# Patient Record
Sex: MALE | Race: WHITE | URBAN - METROPOLITAN AREA
[De-identification: names, ages, dates, MRNs, and addresses within clinical notes are randomized per-mention and may not be internally consistent; named-entity substitution may affect disease eponyms.]

---

## 2018-11-19 ENCOUNTER — OFFICE VISIT (OUTPATIENT)
Dept: URBAN - METROPOLITAN AREA CLINIC 33 | Facility: CLINIC | Age: 78
End: 2018-11-19
Payer: COMMERCIAL

## 2018-11-19 DIAGNOSIS — E11.3491 TYPE 2 DIAB W SEVERE NONPRLF DIAB RTNOP W/O MACULAR EDEMA, RIGHT EYE: Primary | ICD-10-CM

## 2018-11-19 PROCEDURE — 92134 CPTRZ OPH DX IMG PST SGM RTA: CPT | Performed by: OPHTHALMOLOGY

## 2018-11-19 PROCEDURE — 92014 COMPRE OPH EXAM EST PT 1/>: CPT | Performed by: OPHTHALMOLOGY

## 2018-11-19 ASSESSMENT — KERATOMETRY
OS: 41.25
OD: 41.63

## 2018-11-19 ASSESSMENT — INTRAOCULAR PRESSURE
OS: 18
OD: 16

## 2018-11-19 NOTE — IMPRESSION/PLAN
Impression: Type 2 diab w mild nonprlf diabetic rtnop w/o macular edema, left eye: E11.3292. OS. Condition: stable. Vision: vision affected. No hx of LYNDA tx, laser tx OS in the past. Plan: Discussed diagnosis in detail with patient. Exam shows minimal Diabetic changes OS. No treatment is recommended at this time. Emphasized blood sugar control and advised to keep future appointments with PCP and/or Endocrinologist for the management of Diabetes. Recommend observation for now. OCT shows no active edema. Recommend a follow - up in 4 mos, sooner if there is a change or decrease in vision.

## 2018-11-19 NOTE — IMPRESSION/PLAN
Impression: Type 2 diab w severe nonprlf diab rtnop w/o macular edema, right eye: L64.4440. OD. Condition: stable. Vision: vision affected. Hx of LYNDA tx's 5 - 6 tx's - last tx 08/2018. Plan: Discussed diagnosis in detail with patient. Exam shows minimal Diabetic changes OD. No treatment is recommended at this time based on exam and OCT. Emphasized blood sugar control and advised to keep future appointments with PCP and/or Endocrinologist for the management of Diabetes. Recommend observation for now. OCT shows no active edema OD. Recommend a follow - up in 4 mos, sooner if there is a change or decrease in vision.

## 2019-04-09 ENCOUNTER — OFFICE VISIT (OUTPATIENT)
Dept: URBAN - METROPOLITAN AREA CLINIC 23 | Facility: CLINIC | Age: 79
End: 2019-04-09
Payer: COMMERCIAL

## 2019-04-09 PROCEDURE — 92014 COMPRE OPH EXAM EST PT 1/>: CPT | Performed by: OPHTHALMOLOGY

## 2019-04-09 PROCEDURE — 92134 CPTRZ OPH DX IMG PST SGM RTA: CPT | Performed by: OPHTHALMOLOGY

## 2019-04-09 ASSESSMENT — INTRAOCULAR PRESSURE
OS: 16
OD: 16

## 2019-04-09 NOTE — IMPRESSION/PLAN
Impression: Type 2 diab w moderate nonprlf diab rtnop w macular edema, right eye: P85.5957. OD. Condition: unstable. Vision: vision affected. Hx of BRVO OD. Plan: Discussed diagnosis in detail with patient. Exam OD shows active edema associated with BRVO. Recommend LYNDA tx OD - see notes above.

## 2019-04-09 NOTE — IMPRESSION/PLAN
Impression: Tributary (branch) retinal vein occlusion, right eye, with macular edema: H34.8310. OD. Condition: unstable. Vision: vision affected. Hx of LYNDA tx's 5 - 6 tx's - last tx 08/2018. Plan: Discussed diagnosis in detail with patient. Discussed risks of progression with present condition. Based on findings recommend Intravitreal Injection Therapy to help reduce the swelling and prevent a further reduction in vision and/or possible recovery of vision. Discussed the risks and benefits of tx. All questions answered. Patient elects to proceed with recommendation.  OCT shows active edema associated with BRVO OD.

## 2019-04-09 NOTE — IMPRESSION/PLAN
Impression: Type 2 diab w mild nonprlf diabetic rtnop w/o macular edema, left eye: E11.3292. OS. Condition: stable. Plan: Discussed diagnosis in detail with patient. Exam shows minimal Diabetic changes OS. No treatment is recommended at this time. Emphasized blood sugar control and advised to keep future appointments with PCP and/or Endocrinologist for the management of Diabetes. Recommend observation for now. OCT OS is stable.

## 2019-04-26 ENCOUNTER — PROCEDURE (OUTPATIENT)
Dept: URBAN - METROPOLITAN AREA CLINIC 23 | Facility: CLINIC | Age: 79
End: 2019-04-26
Payer: COMMERCIAL

## 2019-04-26 PROCEDURE — 67028 INJECTION EYE DRUG: CPT | Performed by: OPHTHALMOLOGY

## 2019-05-02 ENCOUNTER — OFFICE VISIT (OUTPATIENT)
Dept: URBAN - METROPOLITAN AREA CLINIC 23 | Facility: CLINIC | Age: 79
End: 2019-05-02
Payer: COMMERCIAL

## 2019-05-02 PROCEDURE — 92002 INTRM OPH EXAM NEW PATIENT: CPT | Performed by: OPTOMETRIST

## 2019-05-02 PROCEDURE — 92012 INTRM OPH EXAM EST PATIENT: CPT | Performed by: OPTOMETRIST

## 2019-05-02 PROCEDURE — 92134 CPTRZ OPH DX IMG PST SGM RTA: CPT | Performed by: OPTOMETRIST

## 2019-05-02 ASSESSMENT — INTRAOCULAR PRESSURE
OD: 19
OS: 19

## 2019-05-02 NOTE — IMPRESSION/PLAN
Impression: Tributary (branch) retinal vein occlusion, right eye, with macular edema: H34.8310 OD. Plan: Discussed diagnosis in detail with patient. Reassured patient of current condition and treatment. Will continue to observe condition and or symptoms. BRVO improving.

## 2019-06-07 ENCOUNTER — OFFICE VISIT (OUTPATIENT)
Dept: URBAN - METROPOLITAN AREA CLINIC 23 | Facility: CLINIC | Age: 79
End: 2019-06-07
Payer: COMMERCIAL

## 2019-06-07 PROCEDURE — 92134 CPTRZ OPH DX IMG PST SGM RTA: CPT | Performed by: OPHTHALMOLOGY

## 2019-06-07 PROCEDURE — 99213 OFFICE O/P EST LOW 20 MIN: CPT | Performed by: OPHTHALMOLOGY

## 2019-06-07 ASSESSMENT — INTRAOCULAR PRESSURE
OS: 15
OD: 14

## 2019-06-07 NOTE — IMPRESSION/PLAN
Impression: Type 2 diab w moderate nonprlf diab rtnop w macular edema, right eye: I65.5532. OD. Condition: stable. Vision: vision affected. Hx of BRVO OD. Plan: Discussed diagnosis in detail with patient. Exam OD shows no active edema. Will continue to observe. Pt instructed to call if vision worsens.

## 2019-06-07 NOTE — IMPRESSION/PLAN
Impression: Tributary (branch) retinal vein occlusion, right eye, with macular edema: H34.8310. OD. Condition: stable. Vision: vision affected. s/p AV OD 04/26/2019 Hx of LYNDA tx's 5 - 6 tx's - last tx 08/2018. Plan: Discussed diagnosis in detail with patient. No treatment is required at this time based on exam and OCT. Recommend  observation for now. Will reassess condition in 2 months.  OCT shows stable no active fluid

## 2019-07-01 ENCOUNTER — OFFICE VISIT (OUTPATIENT)
Dept: URBAN - METROPOLITAN AREA CLINIC 23 | Facility: CLINIC | Age: 79
End: 2019-07-01

## 2019-07-01 DIAGNOSIS — Z02.4 ENCOUNTER FOR EXAMINATION FOR DRIVING LICENSE: Primary | ICD-10-CM

## 2019-07-01 DIAGNOSIS — H52.223 REGULAR ASTIGMATISM, BILATERAL: ICD-10-CM

## 2019-07-01 PROCEDURE — 92083 EXTENDED VISUAL FIELD XM: CPT | Performed by: OPTOMETRIST

## 2019-07-01 PROCEDURE — 92012 INTRM OPH EXAM EST PATIENT: CPT | Performed by: OPTOMETRIST

## 2019-07-01 ASSESSMENT — INTRAOCULAR PRESSURE
OD: 14
OS: 14

## 2019-07-01 ASSESSMENT — VISUAL ACUITY
OS: 20/30
OD: 20/50

## 2019-07-01 NOTE — IMPRESSION/PLAN
Impression: Regular astigmatism, bilateral: H52.223. OU. Plan: New glasses Rx was given today. Discussed diagnosis in detail with patient.

## 2019-07-01 NOTE — IMPRESSION/PLAN
Impression: Encounter for examination for driving license: G05.4. OU. Plan: OU: Discussed diagnosis in detail with patient. Reassured patient of current condition and treatment. No treatment required at this time.

## 2019-08-05 ENCOUNTER — OFFICE VISIT (OUTPATIENT)
Dept: URBAN - METROPOLITAN AREA CLINIC 23 | Facility: CLINIC | Age: 79
End: 2019-08-05
Payer: COMMERCIAL

## 2019-08-05 PROCEDURE — 99213 OFFICE O/P EST LOW 20 MIN: CPT | Performed by: OPHTHALMOLOGY

## 2019-08-05 PROCEDURE — 92134 CPTRZ OPH DX IMG PST SGM RTA: CPT | Performed by: OPHTHALMOLOGY

## 2019-08-05 ASSESSMENT — INTRAOCULAR PRESSURE
OS: 14
OD: 14

## 2019-08-05 NOTE — IMPRESSION/PLAN
Impression: Type 2 diab w moderate nonprlf diab rtnop w macular edema, right eye: D65.8484. OD. Condition: stable. Vision: vision affected. Hx of BRVO OD. Plan: Discussed diagnosis in detail with patient. Exam OD shows active edema. See above notes.

## 2019-08-05 NOTE — IMPRESSION/PLAN
Impression: Tributary (branch) retinal vein occlusion, right eye, with macular edema: H34.8310. OD. Condition: unstable. Vision: vision affected. s/p AV OD 04/26/2019 Hx of LYNDA tx's 5 - 6 tx's - last tx 08/2018. Plan: Discussed diagnosis in detail with patient. Discussed risks of progression with present condition. Based on findings recommend Intravitreal Injection Treatment to help reduce the fluid and prevent a further reduction in vision. Discussed the risks and benefits of tx. All questions answered. Patient elects to proceed with recommendation.  OCT shows increase in leakage

## 2019-08-16 ENCOUNTER — PROCEDURE (OUTPATIENT)
Dept: URBAN - METROPOLITAN AREA CLINIC 23 | Facility: CLINIC | Age: 79
End: 2019-08-16
Payer: COMMERCIAL

## 2019-08-16 PROCEDURE — 67028 INJECTION EYE DRUG: CPT | Performed by: OPHTHALMOLOGY

## 2019-09-27 ENCOUNTER — OFFICE VISIT (OUTPATIENT)
Dept: URBAN - METROPOLITAN AREA CLINIC 23 | Facility: CLINIC | Age: 79
End: 2019-09-27
Payer: COMMERCIAL

## 2019-09-27 PROCEDURE — 92134 CPTRZ OPH DX IMG PST SGM RTA: CPT | Performed by: OPHTHALMOLOGY

## 2019-09-27 PROCEDURE — 99213 OFFICE O/P EST LOW 20 MIN: CPT | Performed by: OPHTHALMOLOGY

## 2019-09-27 ASSESSMENT — INTRAOCULAR PRESSURE
OS: 12
OD: 13

## 2019-09-27 NOTE — IMPRESSION/PLAN
Impression: Tributary (branch) retinal vein occlusion, right eye, with macular edema: H34.8310. OD. Condition: stable. Vision: vision affected. s/p AV OD 08/16/19 Hx of LYNDA tx's 5 - 6 tx's - last tx 08/2018. Plan: Discussed diagnosis in detail with patient. Exam and OCT shows marked decrease in leakage. No treatment is required at this time based on exam and OCT. Recommend observation for now. Will reassess condition in 6 wks.

## 2019-09-27 NOTE — IMPRESSION/PLAN
Impression: Type 2 diab w moderate nonprlf diab rtnop w macular edema, right eye: J94.0893. OD. Condition: stable. Vision: vision affected. Hx of BRVO OD. Plan: Discussed diagnosis in detail with patient. Exam OD shows Marked decrease in leakage. See notes above.

## 2019-11-01 ENCOUNTER — PATIENT COMMUNICATION (OUTPATIENT)
Dept: URBAN - METROPOLITAN AREA CLINIC 23 | Facility: CLINIC | Age: 79
End: 2019-11-01
Payer: COMMERCIAL

## 2019-11-01 DIAGNOSIS — H34.8310 TRIBUTARY (BRANCH) RETINAL VEIN OCCLUSION, RIGHT EYE, WITH MACULAR EDEMA: Primary | ICD-10-CM

## 2019-11-01 PROCEDURE — 92014 COMPRE OPH EXAM EST PT 1/>: CPT | Performed by: OPHTHALMOLOGY

## 2019-11-01 PROCEDURE — 92134 CPTRZ OPH DX IMG PST SGM RTA: CPT | Performed by: OPHTHALMOLOGY

## 2019-11-01 ASSESSMENT — INTRAOCULAR PRESSURE
OD: 16
OS: 16

## 2019-11-01 NOTE — IMPRESSION/PLAN
Impression: Tributary (branch) retinal vein occlusion, right eye, with macular edema: H34.8310. OD. Condition: unstable. Vision: vision affected. s/p AV OD 08/16/19 Hx of LYNDA tx's 5 - 6 tx's - last tx 08/2018. Plan: Discussed diagnosis in detail with patient. Discussed risks of progression. Recommend Macular Photocoagulation Laser treatment MAP RIGHT EYE to help reduce the swelling and prevent a further reduction in vision. Discussed risks/benefits of laser TX. All questions answered. RL1 OCT performed today: minimal fluid OD. Patient understands that additional LYNDA treatments or laser treatments may be needed in the future.

## 2019-11-01 NOTE — IMPRESSION/PLAN
Impression: Type 2 diab w moderate nonprlf diab rtnop w macular edema, right eye: P98.9416. OD. Condition: unstable. Vision: vision affected. Hx of BRVO OD. Plan: Discussed diagnosis in detail with patient. Exam OD shows mild edema. recommend laser tx - see notes above.

## 2019-11-12 ENCOUNTER — SURGERY (OUTPATIENT)
Dept: URBAN - METROPOLITAN AREA SURGERY 11 | Facility: SURGERY | Age: 79
End: 2019-11-12
Payer: COMMERCIAL

## 2019-11-12 PROCEDURE — 67210 TREATMENT OF RETINAL LESION: CPT | Performed by: OPHTHALMOLOGY

## 2019-12-12 ENCOUNTER — POST-OPERATIVE VISIT (OUTPATIENT)
Dept: URBAN - METROPOLITAN AREA CLINIC 23 | Facility: CLINIC | Age: 79
End: 2019-12-12

## 2019-12-12 DIAGNOSIS — Z09 ENCNTR FOR F/U EXAM AFT TRTMT FOR COND OTH THAN MALIG NEOPLM: Primary | ICD-10-CM

## 2019-12-12 PROCEDURE — 99024 POSTOP FOLLOW-UP VISIT: CPT | Performed by: OPTOMETRIST

## 2019-12-12 ASSESSMENT — INTRAOCULAR PRESSURE
OD: 16
OS: 15

## 2020-02-11 ENCOUNTER — OFFICE VISIT (OUTPATIENT)
Dept: URBAN - METROPOLITAN AREA CLINIC 23 | Facility: CLINIC | Age: 80
End: 2020-02-11
Payer: COMMERCIAL

## 2020-02-11 DIAGNOSIS — E11.3311 TYPE 2 DIAB W MODERATE NONPRLF DIAB RTNOP W MACULAR EDEMA, RIGHT EYE: ICD-10-CM

## 2020-02-11 PROCEDURE — 99213 OFFICE O/P EST LOW 20 MIN: CPT | Performed by: OPHTHALMOLOGY

## 2020-02-11 PROCEDURE — 92134 CPTRZ OPH DX IMG PST SGM RTA: CPT | Performed by: OPHTHALMOLOGY

## 2020-02-11 ASSESSMENT — INTRAOCULAR PRESSURE
OS: 16
OD: 16

## 2020-02-11 NOTE — IMPRESSION/PLAN
Impression: Type 2 diab w moderate nonprlf diab rtnop w macular edema, right eye: H81.8182. OD. Condition: improving. Vision: vision affected. Hx of BRVO OD. Plan: Discussed diagnosis in detail with patient. Exam OD shows mild edema. see notes above.

## 2020-02-11 NOTE — IMPRESSION/PLAN
Impression: Tributary (branch) retinal vein occlusion, right eye, with macular edema: H34.8310. OD. Condition: improving. Vision: vision affected. s/p MAP OD 11/12/2019,  AV OD 08/16/19 Hx of LYNDA tx's 5 - 6 tx's - last tx 08/2018. Plan: Discussed diagnosis in detail with patient. Exam and OCT OD shows mild edema s/p focal laser treatment. Advised patient no treatment is required at this time based on exam and OCT. Recommend observation for now. Will reassess condition in 6 wks.

## 2020-05-05 ENCOUNTER — OFFICE VISIT (OUTPATIENT)
Dept: URBAN - METROPOLITAN AREA CLINIC 23 | Facility: CLINIC | Age: 80
End: 2020-05-05
Payer: COMMERCIAL

## 2020-05-05 DIAGNOSIS — E11.3292 TYPE 2 DIAB W MILD NONPRLF DIABETIC RTNOP W/O MACULAR EDEMA, LEFT EYE: ICD-10-CM

## 2020-05-05 PROCEDURE — 99213 OFFICE O/P EST LOW 20 MIN: CPT | Performed by: OPHTHALMOLOGY

## 2020-05-05 ASSESSMENT — INTRAOCULAR PRESSURE
OS: 14
OD: 14

## 2020-05-05 NOTE — IMPRESSION/PLAN
Impression: Type 2 diab w mild nonprlf diabetic rtnop w/o macular edema, left eye: E11.3292. OS. Condition: stable. Plan: Due to Coronavirus COVID-19 pandemic and National Emergency, deferred Slit Lamp examination. Findings are based on OCT and Optos. OCT shows stable no active edema  and Optos shows stable macula. Recommend a retina follow - up in 4-6 wks.

## 2020-05-05 NOTE — IMPRESSION/PLAN
Impression: Tributary (branch) retinal vein occlusion, right eye, with macular edema: H34.8310. OD. Condition: unstable. Vision: vision affected. s/p MAP OD 11/12/2019,  AV OD 08/16/19 Hx of LYNDA tx's 5 - 6 tx's - last tx 08/2018. Plan: Due to Coronavirus COVID-19 pandemic and National Emergency, deferred Slit Lamp examination. Findings are based on OCT and Optos. OCT  shows increased edema OD and Optos shows increased edema and heme OD Based on findings recommend to continue with Intravitreal Injection Treatment to help reduce the swelling and prevent a further reduction in vision. Discussed the risks and benefits of tx. All questions answered. Patient elects to proceed with recommendation.

## 2020-05-05 NOTE — IMPRESSION/PLAN
Impression: Type 2 diab w moderate nonprlf diab rtnop w macular edema, right eye: D52.7985. OD. Condition: unstable. Vision: vision affected. Hx of BRVO OD. Plan: Advised patient of condition. Discussed diagnosis in detail with patient.  see notes above

## 2020-05-08 ENCOUNTER — PROCEDURE (OUTPATIENT)
Dept: URBAN - METROPOLITAN AREA CLINIC 23 | Facility: CLINIC | Age: 80
End: 2020-05-08
Payer: COMMERCIAL

## 2020-05-08 PROCEDURE — 67028 INJECTION EYE DRUG: CPT | Performed by: OPHTHALMOLOGY

## 2020-09-18 ENCOUNTER — OFFICE VISIT (OUTPATIENT)
Dept: URBAN - METROPOLITAN AREA CLINIC 23 | Facility: CLINIC | Age: 80
End: 2020-09-18
Payer: COMMERCIAL

## 2020-09-18 PROCEDURE — 92134 CPTRZ OPH DX IMG PST SGM RTA: CPT | Performed by: OPHTHALMOLOGY

## 2020-09-18 PROCEDURE — 99213 OFFICE O/P EST LOW 20 MIN: CPT | Performed by: OPHTHALMOLOGY

## 2020-09-18 ASSESSMENT — INTRAOCULAR PRESSURE
OD: 15
OS: 14

## 2020-09-18 NOTE — IMPRESSION/PLAN
Impression: Tributary (branch) retinal vein occlusion, right eye, with macular edema: H34.8310. OD. Condition: stable. Vision: vision affected. s/p AV OD 05/08/2020, AV OD 08/16/2019
s/p MAP OD 11/12/2019 Hx of LYNDA tx's 5 - 6 tx's - last tx 08/2018. Plan: Due to Coronavirus COVID-19 pandemic and National Emergency, deferred Slit Lamp examination. Findings are based on OCT and Optos. OCT shows a decrease in edema OD and Optos shows a decrease in heme inf temp OD. Recommend a retina follow - up in 3 mos, sooner if there is a change or decrease in vision.

## 2020-09-18 NOTE — IMPRESSION/PLAN
Impression: Type 2 diab w mild nonprlf diabetic rtnop w/o macular edema, left eye: E11.3292. OS. Condition: stable. Plan: Due to Coronavirus COVID-19 pandemic and National Emergency, deferred Slit Lamp examination. Findings are based on OCT and Optos. OCT and Optos shows the macula / retina is stable OS. Recommend a retina follow - up in 3 mos. Emphasized blood sugar control and advised to keep future appointments with PCP and/or Endocrinologist for the management of Diabetes.

## 2020-09-18 NOTE — IMPRESSION/PLAN
Impression: Type 2 diab w moderate nonprlf diab rtnop w macular edema, right eye: V69.3055. OD. Condition: stable. Vision: vision affected. Hx of BRVO OD. Plan: Due to Coronavirus COVID-19 pandemic and National Emergency, deferred Slit Lamp examination. Findings are based on OCT and Optos (see notes above). Based on diagnostic findings recommend observation. Will reassess condition in 3 mos, sooner if there is a change or decrease in vision. Emphasized blood sugar control and advised to keep future appointments with PCP and/or Endocrinologist for the management of Diabetes.

## 2021-01-08 ENCOUNTER — OFFICE VISIT (OUTPATIENT)
Dept: URBAN - METROPOLITAN AREA CLINIC 23 | Facility: CLINIC | Age: 81
End: 2021-01-08
Payer: COMMERCIAL

## 2021-01-08 PROCEDURE — 99213 OFFICE O/P EST LOW 20 MIN: CPT | Performed by: OPHTHALMOLOGY

## 2021-01-08 PROCEDURE — 92134 CPTRZ OPH DX IMG PST SGM RTA: CPT | Performed by: OPHTHALMOLOGY

## 2021-01-08 ASSESSMENT — INTRAOCULAR PRESSURE
OS: 10
OD: 10

## 2021-01-08 NOTE — IMPRESSION/PLAN
Impression: Type 2 diab w moderate nonprlf diab rtnop w macular edema, right eye: T03.1358. OD. Condition: stable. Vision: vision affected. Hx of BRVO OD. Plan: Due to Coronavirus COVID-19 pandemic and National Emergency, deferred Slit Lamp examination. Findings are based on OCT decreased edema and Optos mild dot blot heme. Based on diagnostic findings recommend observation. Will reassess condition in 3 mos, sooner if there is a change or decrease in vision. Emphasized blood sugar control and advised to keep future appointments with PCP and/or Endocrinologist for the management of Diabetes.

## 2021-01-08 NOTE — IMPRESSION/PLAN
Impression: Tributary (branch) retinal vein occlusion, right eye, with macular edema: H34.8310. OD. Condition: stable. Vision: vision affected. s/p AV OD 05/08/2020, AV OD 08/16/2019
s/p MAP OD 11/12/2019 Hx of LYNDA tx's 5 - 6 tx's - last tx 08/2018. Plan: Due to Coronavirus COVID-19 pandemic and National Emergency, deferred Slit Lamp examination. Findings are based on OCT and Optos. OCT shows a decrease in edema OD and Optos shows mild dot blot heme OD. Recommend a retina follow - up in 3 mos, sooner if there is a change or decrease in vision.

## 2021-04-12 ENCOUNTER — OFFICE VISIT (OUTPATIENT)
Dept: URBAN - METROPOLITAN AREA CLINIC 23 | Facility: CLINIC | Age: 81
End: 2021-04-12
Payer: COMMERCIAL

## 2021-04-12 PROCEDURE — 92134 CPTRZ OPH DX IMG PST SGM RTA: CPT | Performed by: OPHTHALMOLOGY

## 2021-04-12 PROCEDURE — 99213 OFFICE O/P EST LOW 20 MIN: CPT | Performed by: OPHTHALMOLOGY

## 2021-04-12 ASSESSMENT — INTRAOCULAR PRESSURE
OS: 15
OD: 15

## 2021-04-12 NOTE — IMPRESSION/PLAN
Impression: Type 2 diab w moderate nonprlf diab rtnop w macular edema, right eye: M71.9052. OD. Condition: stable. Vision: vision affected. Hx of BRVO OD. Plan: Due to Coronavirus COVID-19 pandemic and National Emergency, deferred Slit Lamp examination. Findings are based on OCT minimal edema and Optos minimal edema w/minimal heme OD. Based on diagnostic findings recommend johanny [place]      - see notes above. Emphasized blood sugar control and advised to keep future appointments with PCP and/or Endocrinologist for the management of Diabetes.

## 2021-04-12 NOTE — IMPRESSION/PLAN
Impression: Tributary (branch) retinal vein occlusion, right eye, with macular edema: H34.8310. OD. Condition: unstable. Vision: vision affected. s/p AV OD 05/08/2020, AV OD 08/16/2019
s/p MAP OD 11/12/2019 Hx of LYNDA tx's 5 - 6 tx's - last tx 08/2018. Plan: Due to Coronavirus COVID-19 pandemic and National Emergency, deferred Slit Lamp examination. Findings are based on OCT and Optos. OCT and Optos shows minimal edema with minimal heme OD. Based on findings recommend to restart Intravitreal Injection Treatment RIGHTEYE with AVASTIN to help reduce the swelling / heme and prevent a further reduction in vision. Discussed the risks and benefits of tx. All questions answered. Patient elects to proceed with recommendation.

## 2021-04-23 ENCOUNTER — PROCEDURE (OUTPATIENT)
Dept: URBAN - METROPOLITAN AREA CLINIC 23 | Facility: CLINIC | Age: 81
End: 2021-04-23
Payer: COMMERCIAL

## 2021-04-23 PROCEDURE — 67028 INJECTION EYE DRUG: CPT | Performed by: OPHTHALMOLOGY

## 2021-06-04 ENCOUNTER — OFFICE VISIT (OUTPATIENT)
Dept: URBAN - METROPOLITAN AREA CLINIC 23 | Facility: CLINIC | Age: 81
End: 2021-06-04
Payer: COMMERCIAL

## 2021-06-04 PROCEDURE — 92134 CPTRZ OPH DX IMG PST SGM RTA: CPT | Performed by: OPHTHALMOLOGY

## 2021-06-04 PROCEDURE — 99213 OFFICE O/P EST LOW 20 MIN: CPT | Performed by: OPHTHALMOLOGY

## 2021-06-04 ASSESSMENT — INTRAOCULAR PRESSURE
OD: 14
OS: 14

## 2021-06-04 NOTE — IMPRESSION/PLAN
Impression: Tributary (branch) retinal vein occlusion, right eye, with macular edema: H34.8310. OD. Condition: stable. Vision: vision affected. s/p AV OD 4/23/2021, AV OD 05/08/2020, AV OD 08/16/2019
s/p MAP OD 11/12/2019 Hx of LYNDA tx's 5 - 6 tx's - last tx 08/2018. Plan: Due to Coronavirus COVID-19 pandemic and National Emergency, deferred Slit Lamp examination. Findings are based on OCT and Optos. OCT OD shows decreased edema and Optos shows minimal leakage, improved. No treatment is require at this time. Recommend a retina follow - up in 2 months.

## 2021-06-04 NOTE — IMPRESSION/PLAN
Impression: Type 2 diab w moderate nonprlf diab rtnop w macular edema, right eye: A66.3336. OD. Condition: stable. Vision: vision affected. Hx of BRVO OD. Plan: Due to Coronavirus COVID-19 pandemic and National Emergency, deferred Slit Lamp examination. Findings are based on OCT and Optos. OCT OD shows decreased edema and Optos shows minimal leakage, improved Based on diagnostic findings recommend observation for now. Will reassess condition in 8 wks. Emphasized blood sugar control and advised to keep future appointments with PCP and/or Endocrinologist for the management of Diabetes.

## 2021-06-04 NOTE — IMPRESSION/PLAN
Impression: Type 2 diab w mild nonprlf diabetic rtnop w/o macular edema, left eye: E11.3292. OS. Condition: stable. Plan: Due to Coronavirus COVID-19 pandemic and National Emergency, deferred Slit Lamp examination. Findings are based on OCT and Optos. OCT and Optos shows the macula / retina is stable OS. Recommend a retina follow - up in 2 mos. Emphasized blood sugar control and advised to keep future appointments with PCP and/or Endocrinologist for the management of Diabetes.

## 2021-07-12 ENCOUNTER — OFFICE VISIT (OUTPATIENT)
Dept: URBAN - METROPOLITAN AREA CLINIC 23 | Facility: CLINIC | Age: 81
End: 2021-07-12
Payer: COMMERCIAL

## 2021-07-12 PROCEDURE — 92014 COMPRE OPH EXAM EST PT 1/>: CPT | Performed by: OPTOMETRIST

## 2021-07-12 ASSESSMENT — INTRAOCULAR PRESSURE
OS: 15
OD: 15

## 2021-07-12 ASSESSMENT — VISUAL ACUITY
OS: 20/30
OD: 20/40

## 2022-02-16 ENCOUNTER — OFFICE VISIT (OUTPATIENT)
Dept: URBAN - METROPOLITAN AREA CLINIC 23 | Facility: CLINIC | Age: 82
End: 2022-02-16
Payer: COMMERCIAL

## 2022-02-16 DIAGNOSIS — H52.4 PRESBYOPIA: Primary | ICD-10-CM

## 2022-02-16 PROCEDURE — 92014 COMPRE OPH EXAM EST PT 1/>: CPT | Performed by: OPTOMETRIST

## 2022-02-16 ASSESSMENT — INTRAOCULAR PRESSURE
OS: 15
OD: 17

## 2022-02-16 ASSESSMENT — VISUAL ACUITY
OD: 20/40
OS: 20/30

## 2022-11-18 ENCOUNTER — OFFICE VISIT (OUTPATIENT)
Dept: URBAN - METROPOLITAN AREA CLINIC 23 | Facility: CLINIC | Age: 82
End: 2022-11-18
Payer: COMMERCIAL

## 2022-11-18 DIAGNOSIS — E11.3393 TYPE 2 DIAB W MODERATE NONPRLF DIAB RTNOP W/O MACULAR EDEMA, BILATERAL: Primary | ICD-10-CM

## 2022-11-18 DIAGNOSIS — H25.12 AGE-RELATED NUCLEAR CATARACT, LEFT EYE: ICD-10-CM

## 2022-11-18 PROCEDURE — 99214 OFFICE O/P EST MOD 30 MIN: CPT | Performed by: OPTOMETRIST

## 2022-11-18 ASSESSMENT — INTRAOCULAR PRESSURE
OS: 14
OD: 14

## 2022-11-18 NOTE — IMPRESSION/PLAN
Impression: Type 2 diab w moderate nonprlf diab rtnop w/o macular edema, bilateral: L33.0357. Plan: Discussed diagnosis in detail with patient. Advised and emphasized patient of blood sugar control. Discussed risks of progression. Poor compliance can lead to blindness. Optos performed and reviewed with patient today. Reassured patient of condition and treatment. Will continue to observe condition and or symptoms.

## 2022-11-18 NOTE — IMPRESSION/PLAN
Impression: Age-related nuclear cataract, left eye: H25.12. Plan: Cataracts account for the patient's complaints. Discussed diagnosis in detail with patient. Discussed all R/B's and procedures. Patient understands changing glasses prescription will not significantly improve vision. Patient elects to have surgery, phacoemulsification with intraocular lens recommended. Discussed lens options of Toric/Multifocal, or Standard lens, RL2. Will refer to cataract surgeon for pre-operative evaluation.

## 2022-12-13 ENCOUNTER — OFFICE VISIT (OUTPATIENT)
Dept: URBAN - METROPOLITAN AREA CLINIC 23 | Facility: CLINIC | Age: 82
End: 2022-12-13
Payer: COMMERCIAL

## 2022-12-13 DIAGNOSIS — H25.12 AGE-RELATED NUCLEAR CATARACT, LEFT EYE: Primary | ICD-10-CM

## 2022-12-13 DIAGNOSIS — Z96.1 PRESENCE OF PSEUDOPHAKIA: ICD-10-CM

## 2022-12-13 PROCEDURE — 99214 OFFICE O/P EST MOD 30 MIN: CPT | Performed by: OPHTHALMOLOGY

## 2022-12-13 RX ORDER — PREDNISOLONE ACETATE 10 MG/ML
1 % SUSPENSION/ DROPS OPHTHALMIC
Qty: 10 | Refills: 1 | Status: ACTIVE
Start: 2022-12-13

## 2022-12-13 RX ORDER — OFLOXACIN 3 MG/ML
0.3 % SOLUTION/ DROPS OPHTHALMIC
Qty: 5 | Refills: 1 | Status: ACTIVE
Start: 2022-12-13

## 2022-12-13 ASSESSMENT — INTRAOCULAR PRESSURE
OD: 15
OS: 15

## 2022-12-13 ASSESSMENT — VISUAL ACUITY
OS: 20/70
OD: 20/60

## 2022-12-13 NOTE — IMPRESSION/PLAN
Impression: Age-related nuclear cataract, left eye: H25.12. Condition: established, worsening. Symptoms: could improve with surgery. Plan: Cataract accounts for patient's complaints. Reviewed risks, benefits, and procedure. Patient desires surgery, schedule ce/iol OS, RL2, discussed lens options, distance refractive target, patient is clear for surgery in [facility].

## 2022-12-13 NOTE — IMPRESSION/PLAN
Impression: Presence of pseudophakia: Z96.1. Plan: Discussed diagnosis w/ pt. Will continue to monitor with yearly exams.

## 2024-04-30 ENCOUNTER — OFFICE VISIT (OUTPATIENT)
Dept: URBAN - METROPOLITAN AREA CLINIC 23 | Facility: CLINIC | Age: 84
End: 2024-04-30
Payer: COMMERCIAL

## 2024-04-30 DIAGNOSIS — E11.3411 TYPE 2 DIAB W SEVERE NONPRLF DIABETIC RTNOP W MACULAR EDEMA, RIGHT EYE: Primary | ICD-10-CM

## 2024-04-30 DIAGNOSIS — H25.12 AGE-RELATED NUCLEAR CATARACT, LEFT EYE: ICD-10-CM

## 2024-04-30 DIAGNOSIS — E11.3392 TYPE 2 DIAB W MODERATE NONPRLF DIAB RTNOP W/O MACULAR EDEMA, LEFT EYE: ICD-10-CM

## 2024-04-30 PROCEDURE — 99214 OFFICE O/P EST MOD 30 MIN: CPT | Performed by: OPTOMETRIST

## 2024-04-30 PROCEDURE — 92134 CPTRZ OPH DX IMG PST SGM RTA: CPT | Performed by: OPTOMETRIST

## 2024-04-30 ASSESSMENT — INTRAOCULAR PRESSURE
OS: 17
OD: 18

## 2024-04-30 ASSESSMENT — KERATOMETRY
OD: 41.88
OS: 41.63

## 2024-07-23 ENCOUNTER — OFFICE VISIT (OUTPATIENT)
Dept: URBAN - METROPOLITAN AREA CLINIC 23 | Facility: CLINIC | Age: 84
End: 2024-07-23
Payer: COMMERCIAL

## 2024-07-23 DIAGNOSIS — H25.12 AGE-RELATED NUCLEAR CATARACT, LEFT EYE: ICD-10-CM

## 2024-07-23 PROCEDURE — 99213 OFFICE O/P EST LOW 20 MIN: CPT | Performed by: OPHTHALMOLOGY

## 2024-07-23 PROCEDURE — 92134 CPTRZ OPH DX IMG PST SGM RTA: CPT | Performed by: OPHTHALMOLOGY

## 2024-07-23 ASSESSMENT — INTRAOCULAR PRESSURE
OS: 16
OD: 15

## 2024-07-24 ENCOUNTER — OFFICE VISIT (OUTPATIENT)
Dept: URBAN - METROPOLITAN AREA CLINIC 23 | Facility: CLINIC | Age: 84
End: 2024-07-24
Payer: COMMERCIAL

## 2024-07-24 DIAGNOSIS — E11.3292 TYPE 2 DIAB W MILD NONPRLF DIABETIC RTNOP W/O MACULAR EDEMA, LEFT EYE: ICD-10-CM

## 2024-07-24 DIAGNOSIS — E11.3311 TYPE 2 DIAB WITH MOD NONP RTNOP WITH MACULAR EDEMA, R EYE: ICD-10-CM

## 2024-07-24 PROCEDURE — 99214 OFFICE O/P EST MOD 30 MIN: CPT | Performed by: OPHTHALMOLOGY

## 2024-07-24 RX ORDER — OFLOXACIN 3 MG/ML
0.3 % SOLUTION/ DROPS OPHTHALMIC
Qty: 5 | Refills: 1 | Status: ACTIVE
Start: 2024-07-24

## 2024-07-24 RX ORDER — PREDNISOLONE ACETATE 10 MG/ML
1 % SUSPENSION/ DROPS OPHTHALMIC
Qty: 10 | Refills: 1 | Status: ACTIVE
Start: 2024-07-24

## 2024-07-24 ASSESSMENT — KERATOMETRY
OS: 41.50
OD: 41.88

## 2024-07-24 ASSESSMENT — VISUAL ACUITY
OD: 20/150
OS: 20/70

## 2024-07-24 ASSESSMENT — INTRAOCULAR PRESSURE
OD: 14
OS: 14

## 2024-07-26 ENCOUNTER — OFFICE VISIT (OUTPATIENT)
Dept: URBAN - METROPOLITAN AREA CLINIC 23 | Facility: CLINIC | Age: 84
End: 2024-07-26
Payer: COMMERCIAL

## 2024-07-26 PROCEDURE — 67028 INJECTION EYE DRUG: CPT | Performed by: OPHTHALMOLOGY

## 2024-07-30 ENCOUNTER — TECH ONLY (OUTPATIENT)
Dept: URBAN - METROPOLITAN AREA CLINIC 23 | Facility: CLINIC | Age: 84
End: 2024-07-30
Payer: COMMERCIAL

## 2024-07-30 DIAGNOSIS — H25.12 AGE-RELATED NUCLEAR CATARACT, LEFT EYE: Primary | ICD-10-CM

## 2024-07-30 ASSESSMENT — PACHYMETRY
OD: 23.67
OS: 2.57
OS: 23.34
OD: 3.97

## 2024-08-07 ENCOUNTER — SURGERY (OUTPATIENT)
Dept: URBAN - METROPOLITAN AREA SURGERY 11 | Facility: SURGERY | Age: 84
End: 2024-08-07
Payer: COMMERCIAL

## 2024-08-07 PROCEDURE — 66984 XCAPSL CTRC RMVL W/O ECP: CPT | Performed by: OPHTHALMOLOGY

## 2024-08-08 ENCOUNTER — POST-OPERATIVE VISIT (OUTPATIENT)
Dept: URBAN - METROPOLITAN AREA CLINIC 23 | Facility: CLINIC | Age: 84
End: 2024-08-08
Payer: COMMERCIAL

## 2024-08-08 DIAGNOSIS — Z48.810 ENCOUNTER FOR SURGICAL AFTERCARE FOLLOWING SURGERY ON A SENSE ORGAN: Primary | ICD-10-CM

## 2024-08-08 PROCEDURE — 99024 POSTOP FOLLOW-UP VISIT: CPT | Performed by: OPTOMETRIST

## 2024-08-08 ASSESSMENT — INTRAOCULAR PRESSURE
OS: 16
OD: 14

## 2024-08-15 ENCOUNTER — POST-OPERATIVE VISIT (OUTPATIENT)
Dept: URBAN - METROPOLITAN AREA CLINIC 23 | Facility: CLINIC | Age: 84
End: 2024-08-15
Payer: COMMERCIAL

## 2024-08-15 DIAGNOSIS — Z96.1 PRESENCE OF INTRAOCULAR LENS: ICD-10-CM

## 2024-08-15 DIAGNOSIS — H52.4 PRESBYOPIA: Primary | ICD-10-CM

## 2024-08-15 PROCEDURE — 99024 POSTOP FOLLOW-UP VISIT: CPT | Performed by: OPTOMETRIST

## 2024-08-15 ASSESSMENT — VISUAL ACUITY
OD: 20/200
OS: 20/30

## 2024-08-15 ASSESSMENT — INTRAOCULAR PRESSURE
OD: 15
OS: 15

## 2024-09-12 ENCOUNTER — POST-OPERATIVE VISIT (OUTPATIENT)
Dept: URBAN - METROPOLITAN AREA CLINIC 23 | Facility: CLINIC | Age: 84
End: 2024-09-12
Payer: COMMERCIAL

## 2024-09-12 DIAGNOSIS — Z96.1 PRESENCE OF INTRAOCULAR LENS: ICD-10-CM

## 2024-09-12 DIAGNOSIS — H52.4 PRESBYOPIA: Primary | ICD-10-CM

## 2024-09-12 PROCEDURE — 99024 POSTOP FOLLOW-UP VISIT: CPT | Performed by: OPTOMETRIST

## 2024-09-12 ASSESSMENT — VISUAL ACUITY
OD: 20/200
OS: 20/30

## 2024-09-12 ASSESSMENT — INTRAOCULAR PRESSURE
OD: 16
OS: 16

## 2024-09-17 ENCOUNTER — OFFICE VISIT (OUTPATIENT)
Dept: URBAN - METROPOLITAN AREA CLINIC 23 | Facility: CLINIC | Age: 84
End: 2024-09-17
Payer: COMMERCIAL

## 2024-09-17 DIAGNOSIS — E11.3292 TYPE 2 DIAB W MILD NONPRLF DIABETIC RTNOP W/O MACULAR EDEMA, LEFT EYE: ICD-10-CM

## 2024-09-17 DIAGNOSIS — E11.3311 TYPE 2 DIABETES MELLITUS WITH MODERATE NONPROLIFERATIVE DIABETIC RETINOPATHY WITH MACULAR EDEMA, RIGHT EYE: Primary | ICD-10-CM

## 2024-09-17 PROCEDURE — 99213 OFFICE O/P EST LOW 20 MIN: CPT | Performed by: OPHTHALMOLOGY

## 2024-09-17 PROCEDURE — 92134 CPTRZ OPH DX IMG PST SGM RTA: CPT | Performed by: OPHTHALMOLOGY

## 2024-09-17 ASSESSMENT — INTRAOCULAR PRESSURE
OD: 16
OS: 16

## 2024-09-20 ENCOUNTER — OFFICE VISIT (OUTPATIENT)
Dept: URBAN - METROPOLITAN AREA CLINIC 23 | Facility: CLINIC | Age: 84
End: 2024-09-20
Payer: COMMERCIAL

## 2024-09-20 PROCEDURE — 67028 INJECTION EYE DRUG: CPT | Performed by: OPHTHALMOLOGY

## 2025-02-20 ENCOUNTER — OFFICE VISIT (OUTPATIENT)
Dept: URBAN - METROPOLITAN AREA CLINIC 23 | Facility: CLINIC | Age: 85
End: 2025-02-20
Payer: COMMERCIAL

## 2025-02-20 DIAGNOSIS — E11.3311 TYPE 2 DIAB WITH MOD NONP RTNOP WITH MACULAR EDEMA, R EYE: Primary | ICD-10-CM

## 2025-02-20 DIAGNOSIS — E11.3292 TYPE 2 DIAB W MILD NONPRLF DIABETIC RTNOP W/O MACULAR EDEMA, LEFT EYE: ICD-10-CM

## 2025-02-20 PROCEDURE — 92134 CPTRZ OPH DX IMG PST SGM RTA: CPT | Performed by: OPHTHALMOLOGY

## 2025-02-20 PROCEDURE — 99213 OFFICE O/P EST LOW 20 MIN: CPT | Performed by: OPHTHALMOLOGY

## 2025-02-20 ASSESSMENT — INTRAOCULAR PRESSURE
OD: 14
OS: 13